# Patient Record
(demographics unavailable — no encounter records)

---

## 2025-01-15 NOTE — FAMILY HISTORY
[TextEntry] : foster mom is unsure of Jackie's family hx - FOB appears to have emotional control issues and with poor hygiene

## 2025-01-15 NOTE — PLAN
[Adjusted age milestones discussed at length.] : Adjusted age milestones discussed at length. [Adjusted Age growth and feeding parameters discussed at length.] : Adjusted Age growth and feeding parameters discussed at length.  [Parent counseled to decrease milk intake to 16 ounces daily at one year.] : Parent counseled to decrease milk intake to 16 ounces daily at one year.  [Safety counseling given regarding major safety issues for children this age.] : Safety counseling given regarding major safety issues for children this age. [Baby proofing discussed, socket plugs, cord and cable safety, tablecloth-removal.] : Baby proofing discussed, socket plugs, cord and cable safety, tablecloth-removal. [All medications should be stored in a child proof container out of reach of the child.] : All medications should be stored in a child proof container out of reach of the child.  [Reading daily was encouraged.] : Reading daily was encouraged.  [Avoid choking hazards such as peanuts, hot dogs, un-cut grapes, hot dogs, peanut butter, fruits with skins and balloons.] : Avoid choking hazards such as peanuts, hot dogs, un-cut grapes, hot dogs, peanut butter, fruits with skins and balloons.  [Discussed dental hygiene, addressed fluoride needs.] : Discussed dental hygiene, addressed fluoride needs.  [FreeTextEntry1] : psychological evaluation for ASD

## 2025-01-15 NOTE — BIRTH HISTORY
[At ___ Weeks Gestation] : at [unfilled] weeks gestation [Normal Vaginal Route] : by normal vaginal route [FreeTextEntry1] : 8067 grams  [FreeTextEntry3] : Born at [facility] with uneventful delivery and  course. On  (DOL 7), she had decreased PO intake. The next day, father reported he found the infant face down in the crib around 9:00 am. He gave her 5-7 rescue breaths, after which the patient started to breathe spontaneously. Throughout the day, she continued to have decreased PO. Later that day, around 7:00 pm, she woke up gasping while on her back. EMS was called and she was brought to [facility] ED. She was found to be lethargic, initial gas pH 7.28, BE -14. In the NICU, her metabolic acidosis worsened (7.07/20/80/-20), she was given bicarb and eventually required intubation. She also had low BPs, requiring pressors. There was concern for seizure-like activity (right upper extremity twitching, tongue twitching). She was treated with Ativan and Phenobarbital (20 mg/kg + 10 mg/kg). She was transferred to [facility] for further care.

## 2025-01-15 NOTE — PHYSICAL EXAM
[Creep] : creeps [Crawl] : crawls  [Come to Sit] : comes to sit [Pull to Stand] : pulls to stand [Unfisted] : unfisted [Manipulates Fingers] : manipulates fingers [Transfer] : transfers objects [Unilateral Reach/Grasp] : unilaterally reaches/grasps  [Mature Pincer] : has mature pincer [Voluntary Release] : voluntary release  [Finger Feeding] : finger feeding  [Cup] : uses a cup [Alert To Sounds] : alert to sounds [Soothes When Picked Up] : soothes when picked up  [Social Smile] : has a social smile [Orients To Voice] : orients to voice [Understands "No"] : understands "No" ["Chapis Aguilar"] : chapis springer [Razzing] : razzing [Babbling] : babbling [Normal] : awake and interactive, normal strength tone throughout. [Senory Issues] : sensory issues present [Cruise] : does not cruise [Handedness] : hand preference not noted [Spoon] : does not use a spoon [[place] with Fork] : does not spear with fork [Helps with Dressing] : does not help with dressing [Helps with Undressing] : does not help with undressing [1 Step Command with Gesture] : does not follow 1 step commands with gesture [1 Step Command without Gesture] : does not follow 1 step commands without gesture ["Arash" Appropriately] : says "Arash" inappropriately ["Mama" Appropriately] : says "Mama" inappropriately [de-identified] : can drink out of straw cup and sippy cup, likes music and tries to hum along, would pull caregiverss clothes to get their attention, does not have functional play - typically would look at them and touch them, occasionally would immitate the sing along movements but everyday activities except feeding brother  [de-identified] : does not point to communicate her needs, claps her hands - approriately most of the times [de-identified] : cries to express her need and when she needs attention, attempts to make ah eh sounds but does not have communicative words  [Responsivity] : abnormal responsivity [Interaction] : abnormal  interaction  [de-identified] : respond to her name around 50% of the time  [de-identified] : been crying for no good reason - need to give her toys to be distracted to stop crying, pulls her hair and all the accessories, peculiar behavior - looks at her hands intensly, no joint attention, does not understand other's emotion, less irritability when left alone  [de-identified] : does not like have her hair combed or washed - puts up a fight, stimming behaviors - bounces her head against back of her high chair or the play pan, spins, repeatively wave tissues or things in front of her face, out of blue would shout or make sounds without context

## 2025-01-15 NOTE — HISTORY OF PRESENT ILLNESS
[Gestational Age: ___] : Gestational Age in Weeks: [unfilled] [Chronological Age: ___] : Chronological Age in Months: [unfilled] [Neurology: ___] : Neurology: [unfilled] [No Feeding Issues] : no feeding issues. [Finger Food] : finger food [Table Food] : table food [None] : No Constipation [Normal] : normal [Early Intervention] : Early Intervention services [___ Minutes] : for [unfilled] minutes [___ Times Weekly] : [unfilled] times weekly [de-identified] : Been in Foster care since 10/8/2022. Parents have been EDMUNDO for over 7-8 months, foster family is in the process of attaining adoption approval  [de-identified] : March 2023:  normal EEG in the awake, drowsy and asleep states.   [de-identified] : overall a decent eater - rice, eggs, potatoes, mashed plantains, chicken, beef, fish  - does not prefer mushy food  [de-identified] : can sleep 10-11 hrs a night - most nights  [de-identified] : all therapies are done virtually - Jackie does not engage for more than 1 minute

## 2025-01-15 NOTE — REASON FOR VISIT
[Follow-Up ] : a  follow-up for [Other: _____] : [unfilled] [FreeTextEntry2] : assess for developmental delay secondary to history of HIE at 7 days of life  [FreeTextEntry3] : Developmental and behavioral progress is of the utmost importance and involves complex nuance. Monitoring children with developmental and behavioral concerns is essential due to potential lifelong implications of diagnoses. [TextEntry] : This visit was provided via telehealth using real-time 2-way audio visual technology. The patient was located at home at the time of the visit. The provider, LAXMI LEIJA, was located at the medical office located in [facility] at the time of the visit. Verbal consent given by Foster Mother.

## 2025-01-15 NOTE — SOCIAL HISTORY
[TextEntry] : lives home with foster mom and foster siblings  [facility]   Mitchell Soto [phone] (will be getting a new  after next week); Nurse Angela [phone]

## 2025-04-30 NOTE — PLAN
[Adjusted age milestones discussed at length.] : Adjusted age milestones discussed at length. [Adjusted Age growth and feeding parameters discussed at length.] : Adjusted Age growth and feeding parameters discussed at length.  [Parent counseled to decrease milk intake to 16 ounces daily at one year.] : Parent counseled to decrease milk intake to 16 ounces daily at one year.  [Safety counseling given regarding major safety issues for children this age.] : Safety counseling given regarding major safety issues for children this age. [Baby proofing discussed, socket plugs, cord and cable safety, tablecloth-removal.] : Baby proofing discussed, socket plugs, cord and cable safety, tablecloth-removal. [All medications should be stored in a child proof container out of reach of the child.] : All medications should be stored in a child proof container out of reach of the child.  [Reading daily was encouraged.] : Reading daily was encouraged.  [Avoid choking hazards such as peanuts, hot dogs, un-cut grapes, hot dogs, peanut butter, fruits with skins and balloons.] : Avoid choking hazards such as peanuts, hot dogs, un-cut grapes, hot dogs, peanut butter, fruits with skins and balloons.  [Discussed dental hygiene, addressed fluoride needs.] : Discussed dental hygiene, addressed fluoride needs.  [FreeTextEntry1] : psychological evaluation for ASD and CPSE evaluation/placement

## 2025-04-30 NOTE — SOCIAL HISTORY
[TextEntry] : lives home with foster mom and foster siblings  [facility]   Mitchell Soto [phone] (will be getting a new  after next week); Nurse Angela [phone]

## 2025-04-30 NOTE — REASON FOR VISIT
[Follow-Up ] : a  follow-up for [Other: _____] : [unfilled] [FreeTextEntry2] : assess for developmental delay secondary to history of HIE at 7 days of life  [FreeTextEntry3] : Developmental and behavioral progress is of the utmost importance and involves complex nuance. Monitoring children with developmental and behavioral concerns is essential due to potential lifelong implications of diagnoses.

## 2025-04-30 NOTE — BIRTH HISTORY
[At ___ Weeks Gestation] : at [unfilled] weeks gestation [Normal Vaginal Route] : by normal vaginal route [FreeTextEntry1] : 5034 grams  [FreeTextEntry3] : Born at [facility] with uneventful delivery and  course. On  (DOL 7), she had decreased PO intake. The next day, father reported he found the infant face down in the crib around 9:00 am. He gave her 5-7 rescue breaths, after which the patient started to breathe spontaneously. Throughout the day, she continued to have decreased PO. Later that day, around 7:00 pm, she woke up gasping while on her back. EMS was called and she was brought to [facility] ED. She was found to be lethargic, initial gas pH 7.28, BE -14. In the NICU, her metabolic acidosis worsened (7.07/20/80/-20), she was given bicarb and eventually required intubation. She also had low BPs, requiring pressors. There was concern for seizure-like activity (right upper extremity twitching, tongue twitching). She was treated with Ativan and Phenobarbital (20 mg/kg + 10 mg/kg). She was transferred to [facility] for further care.

## 2025-04-30 NOTE — HISTORY OF PRESENT ILLNESS
[Gestational Age: ___] : Gestational Age in Weeks: [unfilled] [Chronological Age: ___] : Chronological Age in Months: [unfilled] [Neurology: ___] : Neurology: [unfilled] [No Feeding Issues] : no feeding issues. [Finger Food] : finger food [Table Food] : table food [None] : No Constipation [Normal] : normal [Early Intervention] : Early Intervention services [___ Minutes] : for [unfilled] minutes [___ Times Weekly] : [unfilled] times weekly [de-identified] : Been in Foster care since 10/8/2022. Parents were EDMUNDO for over 7-8 months, but recently they resurfaced. foster family is in the process of attaining adoption approval. [de-identified] : March 2023:  normal EEG in the awake, drowsy and asleep states.   [de-identified] : overall a decent eater - rice, eggs, potatoes, mashed plantains, chicken, beef, fish  - does not prefer mushy food, would inspect and smell new food first   [de-identified] : can sleep 10-11 hrs a night - most nights  [de-identified] : all therapies are finally done in person since February [FreeTextEntry1] : spoke with EI coordinator Clay, who has reached out to [facility] agency to obtain consent for CPSE referral/evaluation, pending consent from the biological parents. [facility] supervisor states that an answer will be obtained latest by Monday.

## 2025-04-30 NOTE — PHYSICAL EXAM
[Creep] : creeps [Crawl] : crawls  [Come to Sit] : comes to sit [Walk Alone] : walks alone [Walk Backwards] : walks backwards [Run] : runs [Unfisted] : unfisted [Manipulates Fingers] : manipulates fingers [Transfer] : transfers objects [Unilateral Reach/Grasp] : unilaterally reaches/grasps  [Mature Pincer] : has mature pincer [Voluntary Release] : voluntary release  [Finger Feeding] : finger feeding  [Cup] : uses a cup [Helps with Dressing] : helps with dressing  [Helps with Undressing] : helps with undressing [Alert To Sounds] : alert to sounds [Soothes When Picked Up] : soothes when picked up  [Social Smile] : has a social smile [Orients To Voice] : orients to voice [Understands "No"] : understands "No" ["Chapis Aguilar"] : chapis springer [Razzing] : razzing [Babbling] : babbling [1 Word Other Than Ma/Da] : uses 1 word other than ma/da [Normal] : awake and interactive, normal strength tone throughout. [Handedness] : hand preference not noted [Spoon] : does not use a spoon [Ob[place] with Fork] : does not spear with fork [1 Step Command with Gesture] : does not follow 1 step commands with gesture [1 Step Command without Gesture] : does not follow 1 step commands without gesture ["Arash" Appropriately] : says "Arash" inappropriately ["Mama" Appropriately] : says "Mama" inappropriately [de-identified] : can drink out of straw cup and sippy cup, likes music and tries to hum along, would pull caregiverss clothes to get their attention, limited functional play - typically would look at them and touch them (since the start of in person therapy able to stack blocks, build lego blocks), good at sharing and would attempt to feed her brother, can take off her socks and clothes, attempts to put on socks, mostly calm, no hitting or bitting behaviors    [de-identified] : does not point to communicate her needs, claps her hands - approriately most of the times [de-identified] : aqua, Kale, and starting to say mom, knows  some letters and numbers, most communication is through sounds and cry, limited communicative words  [Responsivity] : abnormal responsivity [Interaction] : abnormal  interaction  [de-identified] : much improved eye contact but still inconsistent, respond to her name around 70% of the time  [de-identified] : peculiar behavior - looks at her hands intensely, no joint attention, does not understand other's emotion, less irritability when left alone, makes random sounds and stare in space  [de-identified] : does not like have her hair combed/washed and bathing - needs distraction to get her hair done, stimming behaviors - out of blue would shout or make sounds without context, rocks her self in order to fall asleep, takes time to transition